# Patient Record
Sex: MALE | Race: OTHER | NOT HISPANIC OR LATINO
[De-identification: names, ages, dates, MRNs, and addresses within clinical notes are randomized per-mention and may not be internally consistent; named-entity substitution may affect disease eponyms.]

---

## 2021-06-29 ENCOUNTER — APPOINTMENT (OUTPATIENT)
Dept: ORTHOPEDIC SURGERY | Facility: CLINIC | Age: 71
End: 2021-06-29
Payer: MEDICARE

## 2021-06-29 ENCOUNTER — NON-APPOINTMENT (OUTPATIENT)
Age: 71
End: 2021-06-29

## 2021-06-29 DIAGNOSIS — M25.572 PAIN IN RIGHT ANKLE AND JOINTS OF RIGHT FOOT: ICD-10-CM

## 2021-06-29 DIAGNOSIS — M25.571 PAIN IN RIGHT ANKLE AND JOINTS OF RIGHT FOOT: ICD-10-CM

## 2021-06-29 DIAGNOSIS — G89.29 PAIN IN RIGHT ANKLE AND JOINTS OF RIGHT FOOT: ICD-10-CM

## 2021-06-29 PROCEDURE — 73564 X-RAY EXAM KNEE 4 OR MORE: CPT | Mod: RT

## 2021-06-29 PROCEDURE — 99203 OFFICE O/P NEW LOW 30 MIN: CPT

## 2021-06-29 NOTE — CONSULT LETTER
[Dear  ___] : Dear  [unfilled], [Consult Letter:] : I had the pleasure of evaluating your patient, [unfilled]. [Please see my note below.] : Please see my note below. [Consult Closing:] : Thank you very much for allowing me to participate in the care of this patient.  If you have any questions, please do not hesitate to contact me. [Sincerely,] : Sincerely, [FreeTextEntry3] : Dr. Nimesh Barreto \par \par

## 2021-06-29 NOTE — PHYSICAL EXAM
[de-identified] : Right Lower Extremity\par o Knee :\par ¦ Inspection/Palpation :  patellar tendon and diffuse medial and lateral  tenderness to palpation, no swelling, no deformity\par ¦ Range of Motion : 0 - 120 degrees, no crepitus\par ¦ Stability : no valgus or varus instability present on provocative testing, Lachman’s Test (-)\par ¦ Strength : hip flexion 3+/5, adduction 3/5, gluteus medius 3/5 \par o Muscle Bulk : normal muscle bulk present\par o Skin : no erythema, no ecchymosis\par o Sensation : sensation to pin intact\par o Vascular Exam : no edema, no cyanosis, dorsalis pedis artery pulse 2+, posterior tibial artery pulse 2+\par \par o Ankle :\par ¦ Inspection/Palpation :  diffuse tenderness to palpation over the anterior tibiotalar joint line, no swelling, no deformities\par ¦ Range of Motion : arc of motion full and painless in all planes\par ¦ Stability : no joint instability on provocative testing\par ¦ Strength : all muscles 5/5\par o Muscle Bulk : no atrophy\par o Sensation : sensation intact to light touch\par o Skin : no skin lesions, no discoloration\par o Vascular Exam : no edema, no cyanosis,  posterior tibialis and dorsalis pedis pulses normal \par o Special Tests: Anterior Drawer (-) \par  \par Left Lower Extremity\par o Knee :\par ¦ Inspection/Palpation : medial joint line tenderness to palpation,  no swelling, no deformity\par ¦ Range of Motion : 0 -120 degrees, no crepitus\par ¦ Stability : no valgus or varus instability present on provocative testing, Lachman’s Test (-)\par ¦ Strength :  hip flexion 3+/5, adduction 3/5, gluteus medius 3/5 \par o Muscle Bulk : normal muscle bulk present\par o Skin : no erythema, no ecchymosis\par o Sensation : sensation to pin intact\par o Vascular Exam : no edema, no cyanosis, dorsalis pedis artery pulse 2+, posterior tibial artery pulse 2+ \par \par o Ankle :\par ¦ Inspection/Palpation :  diffuse tenderness to palpation over the anterior tibiotalar joint line, no swelling, no deformities\par ¦ Range of Motion : arc of motion full and painless in all planes\par ¦ Stability : no joint instability on provocative testing\par ¦ Strength : all muscles 5/5\par o Muscle Bulk : no atrophy\par o Sensation : sensation intact to light touch\par o Skin : no skin lesions, no discoloration\par o Vascular Exam : no edema, no cyanosis,  posterior tibialis and dorsalis pedis pulses normal \par o Special Tests: Anterior Drawer (-) \par   [de-identified] : o Right Knee : AP, lateral, sunrise, and Louise views of the knee were obtained, there are no soft tissue abnormalities, no fractures, alignment is normal, mild medial and patellofemoral compartment degenerative changes,  normal bone density, no bony lesions.\par \par o Left Knee : AP, lateral, sunrise, and Louise views of the knee were obtained, there are no soft tissue abnormalities, no fractures, alignment is normal, mild medial and patellofemoral compartment degenerative changes, normal bone density, no bony lesions.\par \par

## 2021-06-29 NOTE — HISTORY OF PRESENT ILLNESS
[de-identified] : YOSELIN MOULTON is a 70 year male presenting to the office complaining of bilateral knee pain.  He  presents to the office ambulating independently. Patient reports pain for one month. Denies injury or trauma to the area.  The patient describes the pain as a dull aching, and occasionally sharp pain localized to the medial aspect of his left knee, and  anterior right knee that is intermittent in nature. His symptoms are exacerbated with stairs.  Pain is alleviated with rest.  Patient denies instability, catching or locking of the knee. \par Patient is not taking anything for pain relief at this time. Patient is also complaining of bilateral ankle pain. Patient localizes the pain to the anterior- lateral aspect of the bilateral ankles. Patient denies any other complaints at this time.

## 2021-06-29 NOTE — DISCUSSION/SUMMARY
[de-identified] : The underlying pathophysiology was reviewed in great detail with the patient as well as the various treatment options, including ice, analgesics, NSAIDs, Physical therapy, steroid injections, hyaluronic gel injections.\par \par A prescription for Physical Therapy was provided.\par \par  A home exercise sheet was given and discussed with the patient to follow.A Thera-Band was provided for exercise program.\par \par Activity modifications and restrictions were discussed. I advised avoiding deep bending, squatting and high intensity activity.\par \par Discussed utilization of a supportive shoe or over the counter orthotics.\par \par I have recommended utilizing a knee sleeve to provide added support and stability. \par \par FU 6 weeks \par \par All questions were answered, all alternatives discussed and the patient is in complete agreement with that plan. Follow-up appointment as instructed. Any issues and the patient will call or come in sooner.

## 2024-04-22 ENCOUNTER — APPOINTMENT (OUTPATIENT)
Dept: ORTHOPEDIC SURGERY | Facility: CLINIC | Age: 74
End: 2024-04-22
Payer: MEDICARE

## 2024-04-22 VITALS — HEART RATE: 59 BPM | SYSTOLIC BLOOD PRESSURE: 117 MMHG | DIASTOLIC BLOOD PRESSURE: 75 MMHG

## 2024-04-22 DIAGNOSIS — M25.539 PAIN IN UNSPECIFIED WRIST: ICD-10-CM

## 2024-04-22 DIAGNOSIS — M25.569 PAIN IN UNSPECIFIED KNEE: ICD-10-CM

## 2024-04-22 DIAGNOSIS — M22.2X1 PATELLOFEMORAL DISORDERS, RIGHT KNEE: ICD-10-CM

## 2024-04-22 DIAGNOSIS — M18.9 OSTEOARTHRITIS OF FIRST CARPOMETACARPAL JOINT, UNSPECIFIED: ICD-10-CM

## 2024-04-22 DIAGNOSIS — M22.2X2 PATELLOFEMORAL DISORDERS, RIGHT KNEE: ICD-10-CM

## 2024-04-22 PROCEDURE — 99204 OFFICE O/P NEW MOD 45 MIN: CPT | Mod: 25

## 2024-04-22 PROCEDURE — G2211 COMPLEX E/M VISIT ADD ON: CPT | Mod: NC,1L

## 2024-04-22 PROCEDURE — 73110 X-RAY EXAM OF WRIST: CPT | Mod: LT,RT

## 2024-04-22 PROCEDURE — 73564 X-RAY EXAM KNEE 4 OR MORE: CPT | Mod: LT,RT

## 2024-04-22 RX ORDER — DICLOFENAC POTASSIUM 50 MG/1
50 TABLET, COATED ORAL 3 TIMES DAILY
Qty: 60 | Refills: 2 | Status: ACTIVE | COMMUNITY
Start: 2024-04-22 | End: 1900-01-01

## 2024-04-22 NOTE — DISCUSSION/SUMMARY
[de-identified] : 73yM pw bl knee oa and bl C[facility] OA.  The patient was extensively counseled on treatment options including but not limited to observation, rest/activity modification, bracing, anti-inflammatory medications, physical therapy, injections, and surgery.  The natural history of the disease was thoroughly explained.  We discussed that the majority of the time, this condition can be initially treated conservatively. The patient will proceed with: -PT -diclofenac rx provided - pt instructed to take with meals and not with other nsaid's including advil, aleve, and toradol.  The pt understands to stop taking the medication if any stomach sx develop or they develop any type of bleeding or bruising, at which point they will present to their PMD -pt was instructed on the importance of resting, icing and elevating to minimize swelling -RTC 6w   I have personally obtained the history, reviewed the ROS as noted, and performed the physical examination today.  The patient and I discussed the assessment and options and developed the plan.  All questions were answered and the patient stated their understanding of the treatment plan and appreciation of the visit.   My cumulative time spent on this patient's visit included: Preparation for the visit, review of the medical records, review of pertinent diagnostic studies, examination and counseling of the patient on the above diagnosis, treatment plan and prognosis, orders of diagnostic tests, medications and/or appropriate procedures and documentation in the medical records of today's visit.   Erick Fierro MD

## 2024-04-22 NOTE — PHYSICAL EXAM
[de-identified] : Gen: NAD Resp: Nonlabored respirations, no SOB Gait: Nl   bl Knee: Skin intact No effusion 0-130 AROM Tender MJL 5/5 IP Q EHL TA GS SILT L3-S1 2+ dp pt wwp bcr   1A lachman and (-) pivot shift Grade 1 posterior drawer Stable to v/v at 0 and 30 degrees (-) Dial test at 30, 90 degrees   (+) Marielos, [place] Medial joint pain with shallow 2 leg squat   1+ patellar translation (-) pain with patellar compression/grind (-) J sign (-) apprehension   bl hands (+) grind test, cmc pain Full wrist ROM Nl cascade Able to form fist 5/5 epl fdp io SILT amur 2+ rad bcr (-) PhalenAsiakamoises test  [de-identified] : The following radiographs were ordered and read by me during this patient's visit. I reviewed each radiograph in detail with the patient and discussed the findings as highlighted below.   4 views of the L + R knees were obtained today that show no fracture, or dislocation. There are mild medial degenerative changes seen. There is no malalignment. No obvious osseous abnormality. Otherwise unremarkable.   The following radiographs were ordered and read by me during this patient's visit. I reviewed each radiograph in detail with the patient and discussed the findings as highlighted below.   3 views of the L+R wrist were obtained today that show no fracture, or dislocation. There are mild basal joint degenerative changes seen. There is no malalignment. No obvious osseous abnormality. Otherwise unremarkable.

## 2024-04-22 NOTE — HISTORY OF PRESENT ILLNESS
[de-identified] : 73yM pw bl (L>R) medial knee pain + wrist pain.  Pt notes extending sitting and ascending/descending stairs worsens the pain and symptoms.  Does not recall an inciting traumatic event.  Pt has tried activity modification and NSAIDs with minimal relief, pain level remains a 6/10.  Also notes increased stiffness/worse motion in the knee.  Has not trialed physical therapy or injections.  Pt denies numbness, paresthesias, f/c.   Pt notes the pain interferes with activities of daily life and that overall mobility has been decreased.  They wish to discuss possible treatment options and return to baseline activity and mobility before symptomatic onset.

## 2025-08-22 ENCOUNTER — APPOINTMENT (OUTPATIENT)
Dept: OTOLARYNGOLOGY | Facility: CLINIC | Age: 75
End: 2025-08-22
Payer: MEDICARE

## 2025-08-22 ENCOUNTER — NON-APPOINTMENT (OUTPATIENT)
Age: 75
End: 2025-08-22

## 2025-08-22 VITALS
HEART RATE: 72 BPM | HEIGHT: 63 IN | OXYGEN SATURATION: 97 % | BODY MASS INDEX: 23.92 KG/M2 | WEIGHT: 135 LBS | SYSTOLIC BLOOD PRESSURE: 145 MMHG | DIASTOLIC BLOOD PRESSURE: 83 MMHG

## 2025-08-22 DIAGNOSIS — H90.3 SENSORINEURAL HEARING LOSS, BILATERAL: ICD-10-CM

## 2025-08-22 DIAGNOSIS — H93.293 OTHER ABNORMAL AUDITORY PERCEPTIONS, BILATERAL: ICD-10-CM

## 2025-08-22 DIAGNOSIS — H61.23 IMPACTED CERUMEN, BILATERAL: ICD-10-CM

## 2025-08-22 PROCEDURE — 99203 OFFICE O/P NEW LOW 30 MIN: CPT | Mod: 25

## 2025-08-22 PROCEDURE — 92567 TYMPANOMETRY: CPT

## 2025-08-22 PROCEDURE — 92557 COMPREHENSIVE HEARING TEST: CPT

## 2025-08-22 PROCEDURE — G0268 REMOVAL OF IMPACTED WAX MD: CPT
